# Patient Record
Sex: FEMALE | Race: WHITE | Employment: FULL TIME
[De-identification: names, ages, dates, MRNs, and addresses within clinical notes are randomized per-mention and may not be internally consistent; named-entity substitution may affect disease eponyms.]

---

## 2017-07-22 ENCOUNTER — HEALTH MAINTENANCE LETTER (OUTPATIENT)
Age: 42
End: 2017-07-22

## 2018-09-11 DIAGNOSIS — M25.551 HIP PAIN, RIGHT: Primary | ICD-10-CM

## 2018-09-18 ENCOUNTER — OFFICE VISIT (OUTPATIENT)
Dept: ORTHOPEDICS | Facility: CLINIC | Age: 43
End: 2018-09-18
Payer: COMMERCIAL

## 2018-09-18 ENCOUNTER — RADIANT APPOINTMENT (OUTPATIENT)
Dept: GENERAL RADIOLOGY | Facility: CLINIC | Age: 43
End: 2018-09-18
Attending: ORTHOPAEDIC SURGERY
Payer: COMMERCIAL

## 2018-09-18 VITALS — DIASTOLIC BLOOD PRESSURE: 86 MMHG | SYSTOLIC BLOOD PRESSURE: 120 MMHG | HEART RATE: 101 BPM

## 2018-09-18 DIAGNOSIS — M25.551 HIP PAIN, RIGHT: ICD-10-CM

## 2018-09-18 DIAGNOSIS — M16.11 PRIMARY OSTEOARTHRITIS OF RIGHT HIP: ICD-10-CM

## 2018-09-18 DIAGNOSIS — M54.50 CHRONIC LOW BACK PAIN WITHOUT SCIATICA, UNSPECIFIED BACK PAIN LATERALITY: Primary | ICD-10-CM

## 2018-09-18 DIAGNOSIS — G89.29 CHRONIC LOW BACK PAIN WITHOUT SCIATICA, UNSPECIFIED BACK PAIN LATERALITY: Primary | ICD-10-CM

## 2018-09-18 PROCEDURE — 99213 OFFICE O/P EST LOW 20 MIN: CPT | Performed by: ORTHOPAEDIC SURGERY

## 2018-09-18 PROCEDURE — 73502 X-RAY EXAM HIP UNI 2-3 VIEWS: CPT | Performed by: RADIOLOGY

## 2018-09-18 ASSESSMENT — PAIN SCALES - GENERAL: PAINLEVEL: MILD PAIN (2)

## 2018-09-18 NOTE — NURSING NOTE
Jossie Bradley's goals for this visit include:   Chief Complaint   Patient presents with     Right Hip - Pain       She requests these members of her care team be copied on today's visit information: PCP    PCP: Keyona Sandoval MD    Referring Provider:  No referring provider defined for this encounter.    /86 (BP Location: Left arm, Patient Position: Chair, Cuff Size: Adult Large)  Pulse 101    Do you need any medication refills at today's visit? Flexeril and Percocet      Mari Perales CMA

## 2018-09-18 NOTE — LETTER
9/18/2018         RE: Jossie Bradley  [address] [facility] [address]        Dear Colleague,    Thank you for referring your patient, Jossie Bradley, to the [facility]. Please see a copy of my visit note below.    Returns today with a CC of back pain and hip pain    Low back pain with muscle spasm at the lumbar and lower thoracic  Cont to have aching in the hip as previous. Reports that these are about 50:50.    Exam shows a mild antalgic gait. Points to the groin when describing hip pain, up and down lumbar and T-spine when describing back pain. Hip ROM is comfortable but reports groin pain with IRF. ROM limited compared to the left hip. Hip exam does not reproduce back pain.    We reviewed her radiographs together.   Study Result   Exam: Multiple views of the bilateral hips dated 9/18/2018.     COMPARISON: 6/28/2016.     CLINICAL HISTORY: Right hip pain.     FINDINGS: Multiple views of the bilateral hips were obtained. Spurring  at the femoral head and neck junction of the right hip joint with  spurring at the acetabulum of the right hip. No femoral head collapse.  No displaced fractures. Tiny os acetabuli at the left hip joint.         IMPRESSION: Osteoarthrosis in the right hip joint, as above.     JOSE MANUEL MIRANDA MD       Assessment: increased hip pain, increased degenerative changes. Low back pain. We discussed the diagnosis and the options at length.  Plan: continued non-operative management    Twenty minutes were spent with the patient with greater than 50% on counseling and coordination of care. ,    Again, thank you for allowing me to participate in the care of your patient.        Sincerely,        Esdras Chirinos MD

## 2018-09-18 NOTE — PROGRESS NOTES
Returns today with a CC of back pain and hip pain    Low back pain with muscle spasm at the lumbar and lower thoracic  Cont to have aching in the hip as previous. Reports that these are about 50:50.    Exam shows a mild antalgic gait. Points to the groin when describing hip pain, up and down lumbar and T-spine when describing back pain. Hip ROM is comfortable but reports groin pain with IRF. ROM limited compared to the left hip. Hip exam does not reproduce back pain.    We reviewed her radiographs together.   Study Result   Exam: Multiple views of the bilateral hips dated 9/18/2018.     COMPARISON: 6/28/2016.     CLINICAL HISTORY: Right hip pain.     FINDINGS: Multiple views of the bilateral hips were obtained. Spurring  at the femoral head and neck junction of the right hip joint with  spurring at the acetabulum of the right hip. No femoral head collapse.  No displaced fractures. Tiny os acetabuli at the left hip joint.         IMPRESSION: Osteoarthrosis in the right hip joint, as above.     JOSE MANUEL MIRANDA MD       Assessment: increased hip pain, increased degenerative changes. Low back pain. We discussed the diagnosis and the options at length.  Plan: continued non-operative management    Twenty minutes were spent with the patient with greater than 50% on counseling and coordination of care. ,

## 2018-09-18 NOTE — PATIENT INSTRUCTIONS
Thanks for coming today.  [facility]  [address] [place] [address]    To schedule future appointments in Ortho Clinic, you may call [phone].    To schedule ordered imaging by your provider:   Call Central Imaging [phone]    To schedule an injection ordered by your provider:  Call [facility] Injection scheduling line: [phone]  [email or web address] available online at:  [email or web address]    Please call if any further questions or concerns ([phone]).  Clinic hours 8 am to 5 pm.    Return to clinic (call) if symptoms worsen or fail to improve.

## 2018-09-18 NOTE — MR AVS SNAPSHOT
After Visit Summary   2018    Jossie Bradley    MRN: [id number]           Patient Information     Date Of Birth          1975        Visit Information        Provider Department      2018 11:45 AM Esdras Chirinos MD [facility]        Today's Diagnoses     Chronic low back pain without sciatica, unspecified back pain laterality    -  1    Primary osteoarthritis of right hip          Care Instructions    Thanks for coming today.  [facility]  [address] [place] [address]    To schedule future appointments in Ortho Clinic, you may call [phone].    To schedule ordered imaging by your provider:   Call Central [facility] [phone]    To schedule an injection ordered by your provider:  Call [facility] Injection scheduling line: [phone]  [employer] available online at:  [email or web address]    Please call if any further questions or concerns ([phone]).  Clinic hours 8 am to 5 pm.    Return to clinic (call) if symptoms worsen or fail to improve.            Follow-ups after your visit        Additional Services     [facility] PT, HAND, AND CHIROPRACTIC REFERRAL       **This order will print in the [facility] Scheduling Office**    Physical Therapy, Hand Therapy and Chiropractic Care are available through:    *[facility]  *[facility]  *[facility]    Call one number to schedule at any of the above locations: [phone].    Your provider has referred you to: Physical Therapy at [facility] or [facility]    Indication/Reason for Referral: Lumbar spine strengthening, muscle spasms  Onset of Illness: unkown  Therapy Orders: Evaluate and Treat  Special Programs: None  Special Request: Fabricio Schmitt      Additional Comments for the Therapist or Chiropractor: n/a    Please be aware that coverage of these services is subject to the terms and limitations of your health  insurance plan.  Call member services at your health plan with any benefit or coverage questions.      Please bring the following to your appointment:    *Your personal calendar for scheduling future appointments  *Comfortable clothing                  Who to contact     If you have questions or need follow up information about today's clinic visit or your schedule please contact [facility] directly at [phone].  Normal or non-critical lab and imaging results will be communicated to you by MyChart, letter or phone within 4 business days after the clinic has received the results. If you do not hear from us within 7 days, please contact the clinic through [employer] or phone. If you have a critical or abnormal lab result, we will notify you by phone as soon as possible.  Submit refill requests through [employer] or call your pharmacy and they will forward the refill request to us. Please allow 3 business days for your refill to be completed.          Additional Information About Your Visit        [employer] Information     [employer] gives you secure access to your electronic health record. If you see a primary care provider, you can also send messages to your care team and make appointments. If you have questions, please call your primary care clinic.  If you do not have a primary care provider, please call [phone] and they will assist you.      [employer] is an electronic gateway that provides easy, online access to your medical records. With [employer], you can request a clinic appointment, read your test results, renew a prescription or communicate with your care team.     To access your existing account, please contact your [facility] or call [phone] for assistance.        Care EveryWhere ID     This is your Care EveryWhere ID. This could be used by other organizations to access your [place] medical records  [id number]        Your Vitals Were     Pulse                    101            Blood Pressure from Last 3 Encounters:   09/18/18 120/86   06/28/16 114/82   04/14/15 129/87    Weight from Last 3 Encounters:   06/28/16 87.5 kg (193 lb)   12/17/13 68 kg (150 lb)   11/05/13 68 kg (150 lb)              We Performed the Following     [facility] PT, HAND, AND CHIROPRACTIC REFERRAL        Primary Care Provider Office Phone # Fax #    Keyona Mg MD Lori, [phone]       [facility]  [facility] [address]        Equal Access to Services     [facility]: Hadii aad ku hadasho Soomaali, waaxda luqadaha, qaybta kaalmada adeegyada, waxay rachelin hayaan adewendy rankin . So [facility] [phone].    ATENCIÓN: Si habla español, tiene a granados disposición servicios gratuitos de asistencia lingüística. Llame al [phone].    We comply with applicable federal civil rights laws and [place] laws. We do not discriminate on the basis of race, color, national origin, age, disability, sex, sexual orientation, or gender identity.            Thank you!     Thank you for choosing [facility]  for your care. Our goal is always to provide you with excellent care. Hearing back from our patients is one way we can continue to improve our services. Please take a few minutes to complete the written survey that you may receive in the mail after your visit with us. Thank you!             Your Updated Medication List - Protect others around you: Learn how to safely use, store and throw away your medicines at www.disposemymeds.org.          This list is accurate as of 9/18/18 11:59 PM.  Always use your most recent med list.                   Brand Name Dispense Instructions for use Diagnosis    albuterol 108 (90 Base) MCG/ACT inhaler    PROAIR HFA/PROVENTIL HFA/VENTOLIN HFA     Inhale 2 puffs into the lungs every 4 hours as needed.        CALCIUM + D PO      Take  by mouth daily. CALCIUM - 600 MG PLUS 400 UNITS VITAMIN D DAILY        cholecalciferol 1000  units Tabs      Take  by mouth 2 times daily.        clonazePAM 1 MG tablet    klonoPIN     Take 1 mg by mouth 3 times daily as needed.        FLEXERIL 10 MG tablet   Generic drug:  cyclobenzaprine     25    1 TAB PO TID (Three times per day) as needed for MUSCLE SPASM        fluticasone-salmeterol 250-50 MCG/DOSE diskus inhaler    ADVAIR     Inhale 1-2 puffs into the lungs 2 times daily as needed.        ibuprofen 200 MG tablet    ADVIL/MOTRIN     Take 200 mg by mouth every 4 hours as needed        LEXAPRO PO      Take 30 mg by mouth daily        multivitamin per tablet      Take 1 tablet by mouth daily.        OMEPRAZOLE PO      Take 20 mg by mouth daily        order for DME     1 Units    1 Device. Hot/cold packs    Hip impingement syndrome, Aftercare following surgery of the musculoskeletal system, NEC       PERCOCET 5-325 MG per tablet   Generic drug:  oxyCODONE-acetaminophen      Take by mouth every 4 hours as needed        TOPIRAMATE PO      Take 100 mg by mouth daily        valACYclovir 500 MG tablet    VALTREX          ZYRTEC 10 MG tablet   Generic drug:  cetirizine      Take 10 mg by mouth as needed

## 2020-02-10 ENCOUNTER — HEALTH MAINTENANCE LETTER (OUTPATIENT)
Age: 45
End: 2020-02-10

## 2020-11-16 ENCOUNTER — HEALTH MAINTENANCE LETTER (OUTPATIENT)
Age: 45
End: 2020-11-16

## 2021-02-07 ENCOUNTER — HEALTH MAINTENANCE LETTER (OUTPATIENT)
Age: 46
End: 2021-02-07

## 2021-04-04 ENCOUNTER — HEALTH MAINTENANCE LETTER (OUTPATIENT)
Age: 46
End: 2021-04-04

## 2021-09-18 ENCOUNTER — HEALTH MAINTENANCE LETTER (OUTPATIENT)
Age: 46
End: 2021-09-18

## 2022-03-05 ENCOUNTER — HEALTH MAINTENANCE LETTER (OUTPATIENT)
Age: 47
End: 2022-03-05

## 2022-04-30 ENCOUNTER — HEALTH MAINTENANCE LETTER (OUTPATIENT)
Age: 47
End: 2022-04-30

## 2022-11-20 ENCOUNTER — HEALTH MAINTENANCE LETTER (OUTPATIENT)
Age: 47
End: 2022-11-20

## 2023-04-15 ENCOUNTER — HEALTH MAINTENANCE LETTER (OUTPATIENT)
Age: 48
End: 2023-04-15

## 2023-06-01 ENCOUNTER — HEALTH MAINTENANCE LETTER (OUTPATIENT)
Age: 48
End: 2023-06-01